# Patient Record
Sex: MALE | Race: OTHER | NOT HISPANIC OR LATINO | URBAN - METROPOLITAN AREA
[De-identification: names, ages, dates, MRNs, and addresses within clinical notes are randomized per-mention and may not be internally consistent; named-entity substitution may affect disease eponyms.]

---

## 2021-02-12 ENCOUNTER — TELEPHONE (OUTPATIENT)
Dept: BEHAVIORAL/MENTAL HEALTH CLINIC | Facility: CLINIC | Age: 26
End: 2021-02-12

## 2021-02-12 NOTE — TELEPHONE ENCOUNTER
Behavorial Health Outpatient Intake Questions    Referred    Please advised interviewee that they need to answer all questions truthfully to allow for best care and any misrepresentations of information may affect their ability to be seen at this clinic   => Was this discussed? Yes     BehavGeneral acute hospital Health Outpatient Intake History -     Presenting Problem (in patient's words): need couples counseling  Girlfriend pregnant and nned help getting on the same page     Are there any developmental disabilities? ? If yes, can they speak to you on the phone? If they are too limited to speak to you on phone, refer out No    Are you taking any psychiatric medications? No    => If yes, who prescribes? If yes, are they injectable medications? Does the patient have a language barrier or hearing impairment? No    Have you been treated at [facility] by a therapist or a doctor in the past? If yes, who? No    Has the patient been hospitalized for mental health? No   If yes, how long ago was last hospitalization and where was it? Do you actively use alcohol or marijuana or illegal substances? If yes, what and how much - refer out to Drug and alcohol treatment if use is excessive or daily use of illegal substances no    Do you have a community treatment team or ? No    Legal History-     Does the patient have any history of arrests, assisted/[facility] time, or DUIs? No  If Yes-  1) What types of charges? 2) When were they last incarcerated? 3) Are they currently on parole or probation? Minor Child-    Who has custody of the child? Is there a custody agreement? If there is a custody agreement remind parent that they must bring a copy to the first appt or they will not be seen       Intake Team, please check with provider before scheduling if flags come up such as:  - complex case  - legal history (other than DUI)  - communication barrier concerns are present  - if, in your judgment, this needs further review    ACCEPTED as a patient Yes  => Appointment Date: 2/18/2021    Referred Elsewhere? No    Name of Lawrence Shen #[id number]  Insurance Phone #  If ins is primary or secondary  If patient is a minor, parents information such as Name, D  O B of guarantor

## 2021-02-18 ENCOUNTER — SOCIAL WORK (OUTPATIENT)
Dept: BEHAVIORAL/MENTAL HEALTH CLINIC | Facility: CLINIC | Age: 26
End: 2021-02-18
Payer: COMMERCIAL

## 2021-02-18 DIAGNOSIS — F43.22 ADJUSTMENT DISORDER WITH ANXIETY: Primary | ICD-10-CM

## 2021-02-18 PROCEDURE — 90791 PSYCH DIAGNOSTIC EVALUATION: CPT | Performed by: COUNSELOR
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
## 2021-03-03 ENCOUNTER — TELEMEDICINE (OUTPATIENT)
Dept: BEHAVIORAL/MENTAL HEALTH CLINIC | Facility: CLINIC | Age: 26
End: 2021-03-03
Payer: COMMERCIAL

## 2021-03-03 DIAGNOSIS — F43.22 ADJUSTMENT DISORDER WITH ANXIETY: Primary | ICD-10-CM

## 2021-03-03 PROCEDURE — 90847 FAMILY PSYTX W/PT 50 MIN: CPT | Performed by: COUNSELOR
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
## 2021-03-03 NOTE — BH TREATMENT PLAN
Mag Quintanilla  1995       Date of Initial Treatment Plan: 03/03/21  Date of Current Treatment Plan: 03/03/21    Treatment Plan Number 1    Strengths/Personal Resources for Self Care: Verbal skills, motivated for treatment    Diagnosis:   1  Adjustment disorder with anxiety         Area of Needs: Couples Counseling      Long Term Goal 1: A"Shedding personal toxic behaviors that affect the relationship in order to be a happier couple "    Target Date: 09/03/21  Completion Date: to be determined         Short Term Objectives for Goal 1: Mitch and Sulema Hatchet will each identify & verbalize their toxic behaviors that contribute to relationship stress , MAYO Wahl will indentify and verbalize concerns and behaviors about the other that contribute to relationship stress  and [facility] & Sulema Hatchet will learn and implement two healthy coping strategies to manage conflict  Long Term Goal 2: " In addition to the toxic behaviors, how can we handle stress and basic stuff as a couple "    Target Date: 09/03/21  Completion Date: to be determined    Short Term Objectives for Goal 2: Mitch and Sulema Hatchet will explore and verbalize their style of stress management  and Brien Long will create and implement an approach to manage stress beyond approach/ avoidance  GOAL 1: Modality: Individual 2x per month   Completion Date to be determined and The person(s) responsible for carrying out the plan is  [address] (clients) & Sid Parr ( therapist)      GOAL 2: Modality: Individual 2x per month   Completion Date to be determined and The person(s) responsible for carrying out the plan is  [address] (clients) & Sid Parr ( therapist)       [address]: Diagnosis and Treatment Plan explained to Kyle Paul relates understanding diagnosis and is agreeable to Treatment Plan         Client Comments : Please share your thoughts, feelings, need and/or experiences regarding your treatment plan: "Sounds good"    Treatment Plan done but not signed at time of office visit due to:  Plan reviewed by phone or in person  and verbal consent given due to [place] social ashkan

## 2021-03-04 NOTE — PSYCH
Virtual Regular Visit      Assessment/Plan:    Problem List Items Addressed This Visit        Other    Adjustment disorder with anxiety - Primary               Reason for visit is   Chief Complaint   Patient presents with    Relationship Issues        Encounter provider Campos Jacobs [facility]    Provider located at [address]  [place] [address]  [phone]      Recent Visits  Date Type Provider Dept   03/03/21 Wade Kuo [facility] Therapist [place]   Showing recent visits within past 7 days and meeting all other requirements     Future Appointments  No visits were found meeting these conditions  Showing future appointments within next 150 days and meeting all other requirements        The patient was identified by name and date of birth  Derik Monroyil was informed that this is a telemedicine visit and that the visit is being conducted through Mila and patient was informed that this is a secure, HIPAA-compliant platform  He agrees to proceed     My office door was closed  No one else was in the room  He acknowledged consent and understanding of privacy and security of the video platform  The patient has agreed to participate and understands they can discontinue the visit at any time  Patient is aware this is a billable service  Lauren Burrows is a 32 y o  male See above for data   HPI     No past medical history on file  No past surgical history on file  No current outpatient medications on file  No current facility-administered medications for this visit  Not on File    Review of Systems    Video Exam    There were no vitals filed for this visit      Physical Exam     I spent 90 minutes directly with the patient during this visit      VIRTUAL VISIT [address] [facility] acknowledges that he has consented to an online visit or consultation  He understands that the online visit is based solely on information provided by him, and that, in the absence of a face-to-face physical evaluation by the physician, the diagnosis he receives is both limited and provisional in terms of accuracy and completeness  This is not intended to replace a full medical face-to-face evaluation by the physician  Gerald Franco understands and accepts these terms

## 2021-03-11 ENCOUNTER — TELEMEDICINE (OUTPATIENT)
Dept: BEHAVIORAL/MENTAL HEALTH CLINIC | Facility: CLINIC | Age: 26
End: 2021-03-11
Payer: COMMERCIAL

## 2021-03-11 DIAGNOSIS — F43.22 ADJUSTMENT DISORDER WITH ANXIETY: Primary | ICD-10-CM

## 2021-03-11 PROCEDURE — 90847 FAMILY PSYTX W/PT 50 MIN: CPT | Performed by: COUNSELOR

## 2021-03-11 NOTE — PSYCH
Psychotherapy Provided: Family Therapy     Length of time in session: 90 minutes, follow up in 1 week    Goals addressed in session: Goal 1     Pain:  No    none    0    Current suicide risk : Low       DATA:  Iona Munoz and his girlfriend were in virtual session due to [place] restrictions  Reviewed the week - she is due to give birth any day  Discussed I statements  Couple addressed concerns and how they hope to handle conflict  Difficulty letting go of expectations and control of the other's responses  Challenged them to consider what they can do individually to let go of their toxic behaviors and an alternative response  Girlfriend is still looking for affirmation and guarantees  Suggested she focus on herself and her own choices rather than forcing one from him  ASSESSMENT: Iona Munoz  presents as friendly, calm, attentive and relaxed  His  mood is euthymic with no signs of depression or elevated mood or manic process  His speech is normal in rate, volume and articulation and language skills are intact  Suicidal or self injurious ideas or impulses are denied, as are assaultive or homicidal ideas or intentions  He  denies hallucinations and delusions and there is no apparent thought disorder  Associations are intact , thinking is  generally logical, and thought content is appropriate  His cognitive functioning, based on vocabulary and fund of knowledge, is intact and age appropriate and he  is fully oriented  There are signs of anxiety  There are no attentional or hyperactive difficulties  Insight and judgement appear intact  PLAN:   They will work on individual responses to conflict and reduce their use of old patterns  [facility] Therap[facility] Treatment Plan [facility]: Diagnosis and Treatment Plan explained to Kimberley Parents relates understanding diagnosis and is agreeable to Treatment Plan   Yes   Virtual Regular Visit      Assessment/Plan:    Problem List Items Addressed This Visit     None Reason for visit is   Chief Complaint   Patient presents with    Follow-up        [facility]    [place] [facility] at [facility]  [address]  [address]  [place] [address]  [phone]      Recent Visits  No visits were found meeting these conditions  Showing recent visits within past 7 days and meeting all other requirements     Today's Visits  Date Type Provider Dept   03/11/21 [facility] [place]   Showing today's visits and meeting all other requirements     Future Appointments  No visits were found meeting these conditions  Showing future appointments within next 150 days and meeting all other requirements        The patient was identified by name and date of birth  Neva Batres was informed that this is a telemedicine visit and that the visit is being conducted through [employer] and patient was informed that this is a secure, HIPAA-compliant platform  He agrees to proceed     My office door was closed  No one else was in the room  He acknowledged consent and understanding of privacy and security of the video platform  The patient has agreed to participate and understands they can discontinue the visit at any time  Patient is aware this is a billable service  Cata Del Rosario is a 32 y o  male See above for data   HPI     No past medical history on file  No past surgical history on file  No current outpatient medications on file  No current facility-administered medications for this visit  Not on File    Review of Systems    Video Exam    There were no vitals filed for this visit      Physical Exam     I spent 90 minutes directly with the patient during this visit      Magui acknowledges that he has consented to an online visit or consultation  He understands that the online visit is based solely on information provided by him, and that, in the absence of a face-to-face physical evaluation by the physician, the diagnosis he receives is both limited and provisional in terms of accuracy and completeness  This is not intended to replace a full medical face-to-face evaluation by the physician  Aleah Vogel understands and accepts these terms

## 2021-03-31 ENCOUNTER — TELEMEDICINE (OUTPATIENT)
Dept: BEHAVIORAL/MENTAL HEALTH CLINIC | Facility: CLINIC | Age: 26
End: 2021-03-31
Payer: COMMERCIAL

## 2021-03-31 DIAGNOSIS — F43.22 ADJUSTMENT DISORDER WITH ANXIETY: Primary | ICD-10-CM

## 2021-03-31 PROCEDURE — 90847 FAMILY PSYTX W/PT 50 MIN: CPT | Performed by: COUNSELOR

## 2021-04-16 ENCOUNTER — TELEMEDICINE (OUTPATIENT)
Dept: BEHAVIORAL/MENTAL HEALTH CLINIC | Facility: CLINIC | Age: 26
End: 2021-04-16
Payer: COMMERCIAL

## 2021-04-16 DIAGNOSIS — F43.22 ADJUSTMENT DISORDER WITH ANXIETY: Primary | ICD-10-CM

## 2021-04-16 PROCEDURE — 90847 FAMILY PSYTX W/PT 50 MIN: CPT | Performed by: COUNSELOR

## 2021-04-16 NOTE — PSYCH
Psychotherapy Provided: Family Therapy     Length of time in session: 90 minutes, follow up in 2 week    Goals addressed in session: Goal 1     Pain:  No    none    0    Current suicide risk : Low       DATA:  Mukesh Trevino and Vladimir Vinson were in session virtually  Addressed the concept of trust  During session they were able to identify concerns and use I statements  She questioned his plan for them and what he saw a s their future  He focused on present and day to day  They reviewed their history as a couple and what led them to now  Haff way through session, Mukesh Trevino shifted and blamed Vladimir Vinson for "ruining his life"  He stated that he was on an 18 year trial period and is no longer interested in her as a partner and would never propose to her  She became distraught, upset, and questioned why he never acts this way out of session to which he replied that he could hold out longer than her in a waiting game  She stated that she was going to her mother's and I agreed he should give her space and allow her to do so  ASSESSMENT:   Mukesh Trevino was calm, flat affect  Became cold and nasty almost cutting on purpose when describing why he did not want to be with her  Made jokes after she left session about how this would cost him financially  PLAN:   Offered to follow up with Vladimir Vinson later and asked if I could contact her therapsit to which she agreed  Kayce Mayes I would leave session in place for two weeks to see where they landed or have closure  [facility]: Diagnosis and Treatment Plan explained to Raul Mcghee relates understanding diagnosis and is agreeable to Treatment Plan   Yes   Virtual Regular Visit      Assessment/Plan:    Problem List Items Addressed This Visit        Other    Adjustment disorder with anxiety - Primary               Reason for visit is   Chief Complaint   Patient presents with    Follow-up    Relationship Issues        Encounter provider Edith Carpio [facility], [address]    Provider located at [facility] [address]  [place] [address]  [phone]      Recent Visits  Date Type Provider Dept   04/16/21 Wade Kuo, [address] [facility] [place]   Showing recent visits within past 7 days and meeting all other requirements     Future Appointments  No visits were found meeting these conditions  Showing future appointments within next 150 days and meeting all other requirements        The patient was identified by name and date of birth  Meghan Chcias was informed that this is a telemedicine visit and that the visit is being conducted through [employer] and patient was informed that this is a secure, HIPAA-compliant platform  He agrees to proceed     My office door was closed  No one else was in the room  He acknowledged consent and understanding of privacy and security of the video platform  The patient has agreed to participate and understands they can discontinue the visit at any time  Patient is aware this is a billable service  Estefania Parmar is a 32 y o  male   See above for data   HPI     No past medical history on file  No past surgical history on file  No current outpatient medications on file  No current facility-administered medications for this visit  Not on File    Review of Systems    Video Exam    There were no vitals filed for this visit  Physical Exam     I spent 90 minutes directly with the patient during this visit      Magui acknowledges that he has consented to an online visit or consultation   He understands that the online visit is based solely on information provided by him, and that, in the absence of a face-to-face physical evaluation by the physician, the diagnosis he receives is both limited and provisional in terms of accuracy and completeness  This is not intended to replace a full medical face-to-face evaluation by the physician  Blaire Minor understands and accepts these terms

## 2021-04-21 ENCOUNTER — TELEMEDICINE (OUTPATIENT)
Dept: BEHAVIORAL/MENTAL HEALTH CLINIC | Facility: CLINIC | Age: 26
End: 2021-04-21
Payer: COMMERCIAL

## 2021-04-21 DIAGNOSIS — F43.22 ADJUSTMENT DISORDER WITH ANXIETY: Primary | ICD-10-CM

## 2021-04-21 PROCEDURE — 90847 FAMILY PSYTX W/PT 50 MIN: CPT | Performed by: COUNSELOR

## 2021-04-21 NOTE — PSYCH
Psychotherapy Provided: Family Therapy     Length of time in session: 75 minutes    Goals addressed in session: Goal 1     Pain:  No    none    0    Current suicide risk : Low       DATA:    Brian Quijano and Otilio Cintron were in virtual session  Reviewed events of the week  Reported that they got together on Saturday after she spent the night at his home  Questioned events as originally I was to meet with Otilio Cintron and her therapist then Brian Quijano alone next week  They stated they were confused  Stated they agreed to work on trying to find romantic love again:" for one year  Continued discussion of how they felt right now revealed that Brian Quijano wants to work to get there and Otilio Cintron stated that she loved him  Also he revealed that he was able to reconnect with her by stating he missed the dog and the baby  Continued to process how they are no on the same page and Otilio Cintron has difficulty accepting with Brian Quijano is really saying  She continues to confront him and does the emotional expression and he in turn acts as if she is irrational  Explained the narcissistic pattern that she is experiencing  He admitted that he grew up with a similar pattern between his parents  He stated he was afraid I and her therapist would turn her against him and I stated that I was no longer willing to speak with them together, and the offer was made as a result of last week's session  Reminded him that he and I were also going to have a private session  He stated he was not willing to continue sessions at this time  ASSESSMENT:   Brian Quijano continues to present as cold and withdrawn, matter of fact, at times angry  Otilio Cintron cries and challenges him and he shuts down  Evidence that he manipulates her emotions  PLAN:   They agreed to terminate sessions at this time  [facility]: Diagnosis and Treatment Plan explained to Son Grand relates understanding diagnosis and is agreeable to Treatment Plan   Yes Assessment/Plan:      Diagnoses and all orders for this visit:    Adjustment disorder with anxiety          Subjective:     Patient ID: Andrew Love is a 32 y o  male  Outpatient Discharge Summary:   Admission Date: 2/18/21  Mack Zhang was referred by Marie Parmar  Discharge Date: 4/22/21    Discharge Diagnosis:    1  Adjustment disorder with anxiety         Treating Physician: none  Treatment Complications: none  Presenting Problem: Couples counsleing  Course of treatment includes:    family counseling  Treatment Progress: fair  Criteria for Discharge: Request to take a break at this time  Aftercare recommendations include individual counseling for Mack Zhang  Discharge Medications include:No current outpatient medications on file      Prognosis: fair

## 2021-04-23 NOTE — PSYCH
Virtual Regular Visit      Assessment/Plan:    Problem List Items Addressed This Visit        Other    Adjustment disorder with anxiety - Primary          Goals addressed in session: Goal 1          Reason for visit is   Chief Complaint   Patient presents with    Follow-up        Encounter provider Kendy Mays [facility]    Provider located at [address] [place] [address]  [address] [address]  [phone]      Recent Visits  Date Type Provider Dept   04/21/21 Prashanth Mcguire [facility]   04/16/21 Wade Kuo [facility] [place]   Showing recent visits within past 7 days and meeting all other requirements     Future Appointments  No visits were found meeting these conditions  Showing future appointments within next 150 days and meeting all other requirements        The patient was identified by name and date of birth  Margarita Flor was informed that this is a telemedicine visit and that the visit is being conducted through [address] [place] [address] and patient was informed that this is not a secure, HIPAA-compliant platform  He agrees to proceed     My office door was closed  No one else was in the room  He acknowledged consent and understanding of privacy and security of the video platform  The patient has agreed to participate and understands they can discontinue the visit at any time  Patient is aware this is a billable service  Corey Drake is a 32 y o  male   See section for data   HPI     No past medical history on file  No past surgical history on file  No current outpatient medications on file  No current facility-administered medications for this visit  Not on File    Review of Systems    Video Exam    There were no vitals filed for this visit      Physical Exam I spent 75 minutes directly with the patient during this visit      Magui acknowledges that he has consented to an online visit or consultation  He understands that the online visit is based solely on information provided by him, and that, in the absence of a face-to-face physical evaluation by the physician, the diagnosis he receives is both limited and provisional in terms of accuracy and completeness  This is not intended to replace a full medical face-to-face evaluation by the physician  Shyam Sandy understands and accepts these terms